# Patient Record
Sex: FEMALE | Race: WHITE | NOT HISPANIC OR LATINO | URBAN - METROPOLITAN AREA
[De-identification: names, ages, dates, MRNs, and addresses within clinical notes are randomized per-mention and may not be internally consistent; named-entity substitution may affect disease eponyms.]

---

## 2017-06-27 ENCOUNTER — APPOINTMENT (RX ONLY)
Dept: URBAN - METROPOLITAN AREA CLINIC 23 | Facility: CLINIC | Age: 57
Setting detail: DERMATOLOGY
End: 2017-06-27

## 2017-06-27 DIAGNOSIS — D49.2 NEOPLASM OF UNSPECIFIED BEHAVIOR OF BONE, SOFT TISSUE, AND SKIN: ICD-10-CM

## 2017-06-27 DIAGNOSIS — L82.1 OTHER SEBORRHEIC KERATOSIS: ICD-10-CM

## 2017-06-27 PROCEDURE — ? DEFER

## 2017-06-27 PROCEDURE — 99202 OFFICE O/P NEW SF 15 MIN: CPT

## 2017-06-27 PROCEDURE — ? COUNSELING

## 2017-06-27 ASSESSMENT — LOCATION SIMPLE DESCRIPTION DERM
LOCATION SIMPLE: RIGHT CHEEK
LOCATION SIMPLE: CHEST
LOCATION SIMPLE: RIGHT TEMPLE
LOCATION SIMPLE: LEFT CHEEK

## 2017-06-27 ASSESSMENT — LOCATION DETAILED DESCRIPTION DERM
LOCATION DETAILED: RIGHT MEDIAL MALAR CHEEK
LOCATION DETAILED: RIGHT CENTRAL TEMPLE
LOCATION DETAILED: LEFT INFERIOR MEDIAL MALAR CHEEK
LOCATION DETAILED: MIDDLE STERNUM

## 2017-06-27 ASSESSMENT — LOCATION ZONE DERM
LOCATION ZONE: FACE
LOCATION ZONE: TRUNK

## 2017-08-11 ENCOUNTER — APPOINTMENT (RX ONLY)
Dept: URBAN - METROPOLITAN AREA CLINIC 23 | Facility: CLINIC | Age: 57
Setting detail: DERMATOLOGY
End: 2017-08-11

## 2017-08-11 DIAGNOSIS — D49.2 NEOPLASM OF UNSPECIFIED BEHAVIOR OF BONE, SOFT TISSUE, AND SKIN: ICD-10-CM

## 2017-08-11 PROCEDURE — 11100: CPT

## 2017-08-11 PROCEDURE — ? COUNSELING

## 2017-08-11 PROCEDURE — ? BIOPSY BY PUNCH METHOD

## 2017-08-11 PROCEDURE — ? OTHER

## 2017-08-11 ASSESSMENT — LOCATION DETAILED DESCRIPTION DERM
LOCATION DETAILED: LEFT UPPER CUTANEOUS LIP
LOCATION DETAILED: LEFT INFERIOR MEDIAL MALAR CHEEK
LOCATION DETAILED: RIGHT MEDIAL MALAR CHEEK

## 2017-08-11 ASSESSMENT — LOCATION ZONE DERM
LOCATION ZONE: FACE
LOCATION ZONE: LIP

## 2017-08-11 ASSESSMENT — LOCATION SIMPLE DESCRIPTION DERM
LOCATION SIMPLE: RIGHT CHEEK
LOCATION SIMPLE: LEFT LIP
LOCATION SIMPLE: LEFT CHEEK

## 2017-08-11 NOTE — PROCEDURE: BIOPSY BY PUNCH METHOD
Post-Care Instructions: I reviewed with the patient in detail post-care instructions. Patient is to keep the biopsy site dry overnight, and then apply bacitracin twice daily until healed. Patient may apply hydrogen peroxide soaks to remove any crusting.
Biopsy Type: H and E
Epidermal Sutures: 5-0 Prolene
X Size Of Lesion In Cm (Optional): 0
Hemostasis: None
Anesthesia Type: 1% lidocaine without epinephrine and a 1:10 solution of 8.4% sodium bicarbonate
Notification Instructions: Patient will be notified of biopsy results. However, patient instructed to call the office if not contacted within 2 weeks.
Bill For Surgical Tray: no
Home Suture Removal Text: Patient was provided a home suture removal kit and will remove their sutures at home.  If they have any questions or difficulties they will call the office.
Punch Size In Mm: 3
Anesthesia Volume In Cc: 0.1
Suture Removal: 7 days
Detail Level: Detailed
Billing Type: Third-Party Bill
Dressing: bandage
Wound Care: Vaseline
Consent: Written consent was obtained and risks were reviewed including but not limited to scarring, infection, bleeding, scabbing, incomplete removal, nerve damage and allergy to anesthesia.

## 2017-08-18 ENCOUNTER — APPOINTMENT (RX ONLY)
Dept: URBAN - METROPOLITAN AREA CLINIC 24 | Facility: CLINIC | Age: 57
Setting detail: DERMATOLOGY
End: 2017-08-18

## 2017-08-18 DIAGNOSIS — Z48.02 ENCOUNTER FOR REMOVAL OF SUTURES: ICD-10-CM

## 2017-08-18 PROBLEM — F32.9 MAJOR DEPRESSIVE DISORDER, SINGLE EPISODE, UNSPECIFIED: Status: ACTIVE | Noted: 2017-08-18

## 2017-08-18 PROBLEM — D23.39 OTHER BENIGN NEOPLASM OF SKIN OF OTHER PARTS OF FACE: Status: ACTIVE | Noted: 2017-08-18

## 2017-08-18 PROCEDURE — ? COUNSELING

## 2017-08-18 PROCEDURE — ? OBSERVATION

## 2017-08-18 PROCEDURE — ? SUTURE REMOVAL (GLOBAL PERIOD)

## 2017-08-18 PROCEDURE — ? OTHER

## 2017-08-18 ASSESSMENT — LOCATION SIMPLE DESCRIPTION DERM
LOCATION SIMPLE: LEFT LIP
LOCATION SIMPLE: LEFT LIP

## 2017-08-18 ASSESSMENT — LOCATION DETAILED DESCRIPTION DERM
LOCATION DETAILED: LEFT UPPER CUTANEOUS LIP
LOCATION DETAILED: LEFT UPPER CUTANEOUS LIP

## 2017-08-18 ASSESSMENT — LOCATION ZONE DERM
LOCATION ZONE: LIP
LOCATION ZONE: LIP

## 2017-08-18 NOTE — PROCEDURE: OTHER
Detail Level: Zone
Note Text (......Xxx Chief Complaint.): This diagnosis correlates with the
Other (Free Text): Discussed that is is likely diagnosis given path on other lesion since they look similar. Discussed the only way to know for sure would be to biopsy. She wants to monitor for now. Lesion on right cheek has resolved.

## 2017-08-18 NOTE — PROCEDURE: SUTURE REMOVAL (GLOBAL PERIOD)
Detail Level: Detailed
Add 58537 Cpt? (Important Note: In 2017 The Use Of 38133 Is Being Tracked By Cms To Determine Future Global Period Reimbursement For Global Periods): no

## 2019-05-29 ENCOUNTER — APPOINTMENT (RX ONLY)
Dept: URBAN - METROPOLITAN AREA CLINIC 24 | Facility: CLINIC | Age: 59
Setting detail: DERMATOLOGY
End: 2019-05-29

## 2019-05-29 DIAGNOSIS — L82.1 OTHER SEBORRHEIC KERATOSIS: ICD-10-CM

## 2019-05-29 DIAGNOSIS — Z71.89 OTHER SPECIFIED COUNSELING: ICD-10-CM

## 2019-05-29 PROCEDURE — 99212 OFFICE O/P EST SF 10 MIN: CPT

## 2019-05-29 PROCEDURE — ? OTHER

## 2019-05-29 PROCEDURE — ? COUNSELING

## 2019-05-29 NOTE — PROCEDURE: OTHER
Other (Free Text): Recommended she schedule skin exam this summer
Detail Level: Zone
Note Text (......Xxx Chief Complaint.): This diagnosis correlates with the
Other (Free Text): Plan : Liquid Nitrogen (Cosmetic). A total of 2 lesions were treated with liquid nitrogen, located on the left medial superior chest and right lateral temple. The patient's consent was obtained including but not limited to risks of crusting, scabbing, blistering, scarring, darker or lighter pigmentary change, recurrence, incomplete removal and infection. The patient understands that the procedure is cosmetic in nature and is not covered by insurance.\\nI reviewed with the patient in detail post-care instructions. Patient is to wear sunprotection, and avoid picking at any of the treated lesions. Pt may apply Vaseline to crusted or scabbing areas.

## 2019-10-09 ENCOUNTER — APPOINTMENT (RX ONLY)
Dept: URBAN - METROPOLITAN AREA CLINIC 24 | Facility: CLINIC | Age: 59
Setting detail: DERMATOLOGY
End: 2019-10-09

## 2019-10-09 DIAGNOSIS — L82.1 OTHER SEBORRHEIC KERATOSIS: ICD-10-CM

## 2019-10-09 DIAGNOSIS — Z71.89 OTHER SPECIFIED COUNSELING: ICD-10-CM

## 2019-10-09 DIAGNOSIS — L30.4 ERYTHEMA INTERTRIGO: ICD-10-CM

## 2019-10-09 PROBLEM — F41.9 ANXIETY DISORDER, UNSPECIFIED: Status: ACTIVE | Noted: 2019-10-09

## 2019-10-09 PROBLEM — J30.1 ALLERGIC RHINITIS DUE TO POLLEN: Status: ACTIVE | Noted: 2019-10-09

## 2019-10-09 PROBLEM — D23.39 OTHER BENIGN NEOPLASM OF SKIN OF OTHER PARTS OF FACE: Status: ACTIVE | Noted: 2019-10-09

## 2019-10-09 PROCEDURE — ? COUNSELING

## 2019-10-09 PROCEDURE — ? PRESCRIPTION

## 2019-10-09 PROCEDURE — ? LIQUID NITROGEN (COSMETIC)

## 2019-10-09 PROCEDURE — ? OTHER

## 2019-10-09 PROCEDURE — 99213 OFFICE O/P EST LOW 20 MIN: CPT

## 2019-10-09 PROCEDURE — ? REFERRAL

## 2019-10-09 RX ORDER — KETOCONAZOLE 20 MG/G
CREAM TOPICAL
Qty: 60 | Refills: 3 | Status: ERX | COMMUNITY
Start: 2019-10-09

## 2019-10-09 RX ADMIN — KETOCONAZOLE: 20 CREAM TOPICAL at 18:23

## 2019-10-09 ASSESSMENT — LOCATION SIMPLE DESCRIPTION DERM
LOCATION SIMPLE: RIGHT CHEEK
LOCATION SIMPLE: CHEST

## 2019-10-09 ASSESSMENT — LOCATION DETAILED DESCRIPTION DERM
LOCATION DETAILED: MIDDLE STERNUM
LOCATION DETAILED: RIGHT MEDIAL MALAR CHEEK

## 2019-10-09 ASSESSMENT — LOCATION ZONE DERM
LOCATION ZONE: TRUNK
LOCATION ZONE: FACE

## 2019-10-09 NOTE — HPI: RASH
What Type Of Note Output Would You Prefer (Optional)?: Standard Output
How Severe Is Your Rash?: severe
Is This A New Presentation, Or A Follow-Up?: Rash
Additional History: Seems worse at end of day.

## 2019-10-09 NOTE — PROCEDURE: OTHER
Detail Level: Zone
Note Text (......Xxx Chief Complaint.): This diagnosis correlates with the
Other (Free Text): Recommended skin exam
Other (Free Text): Discussed i&d vs punch excision vs excision w Plastic Surgery, and she chose Plastics. She was given their phone number.

## 2021-06-02 ENCOUNTER — HOSPITAL ENCOUNTER (OUTPATIENT)
Dept: PHYSICAL THERAPY | Age: 61
Discharge: HOME | End: 2021-06-02
Payer: COMMERCIAL

## 2021-06-02 DIAGNOSIS — M48.02 CERVICAL SPINAL STENOSIS: ICD-10-CM

## 2021-06-02 DIAGNOSIS — M50.30 DDD (DEGENERATIVE DISC DISEASE), CERVICAL: ICD-10-CM

## 2021-06-02 DIAGNOSIS — M47.816 FACET ARTHROPATHY, LUMBAR: ICD-10-CM

## 2021-06-02 DIAGNOSIS — M51.36 DDD (DEGENERATIVE DISC DISEASE), LUMBAR: ICD-10-CM

## 2021-06-02 PROCEDURE — 97161 PT EVAL LOW COMPLEX 20 MIN: CPT

## 2021-06-02 PROCEDURE — 97140 MANUAL THERAPY 1/> REGIONS: CPT

## 2021-06-02 PROCEDURE — 97110 THERAPEUTIC EXERCISES: CPT

## 2021-06-02 NOTE — THERAPY EVALUATION
Sourav Rosa  : 1960  Primary: [facility]  Secondary:  Therapy Center at [facility] Emmett [address], [address] [facility]  [phone]   [phone]      OUTPATIENT PHYSICAL THERAPY:Initial Evaluation 2021    ICD-10: Treatment Diagnosis:   Cervicalgia (M54.2)  Radiculopathy, Cervical Region (M54.12)  Headache (R51)  Low back pain (M54.5)  Radiculopathy, Lumbar Region (M54.16)  Muscle Weakness, Generalized (M62.81)    Precautions/Allergies:   Patient has no known allergies. Fall Risk Score: 0 (? 5 = High Risk)  MD Orders: Eval and Treat MEDICAL/REFERRING DIAGNOSIS:  DDD (degenerative disc disease), cervical [M50.30]  Cervical spinal stenosis [M48.02]  DDD (degenerative disc disease), lumbar [M51.36]  Facet arthropathy, lumbar [M47.816]   DATE OF ONSET: Years  REFERRING PHYSICIAN: Vijaya Zazueta PA  RETURN PHYSICIAN APPOINTMENT:      INITIAL ASSESSMENT:  Ms. Sourav Rosa has attended 1 physical therapy session including initial evaluation. Sourav Rosa presents with S/S of increased pain, decreased ROM, decreased strength, decreased functional tolerance consistent with S/S of Cervicalgia and Low back pain. Sourav Rosa will benefit from home exercise program, therapeutic and postural strengthening exercises, manual therapeutic techniques (ie. Distraction, SOR, myofascial release/soft tissue mobilization) as appropriate to address Cifuentes Ouch current condition. Sourav Rosa will benefit from skilled PT (medically necessary) to address above deficits affecting participation in basic ADLs and overall functional tolerance. PROBLEM LIST (Impacting functional limitations):  1. Decreased Strength  2. Decreased ADL/Functional Activities  3. Decreased Transfer Abilities  4. Increased Pain  5. Decreased Activity Tolerance  6. Increased Fatigue  7. Increased Shortness of Breath  8. Decreased Flexibility/Joint Mobility  9.  Decreased [place] with Home Exercise Program INTERVENTIONS PLANNED:  1. Balance Exercise  2. Bed Mobility  3. Cold  4. Cryotherapy  5. Family Education  6. Gait Training  7. Heat  8. Home Exercise Program (HEP)  9. Manual Therapy  10. Neuromuscular Re-education/Strengthening  11. Range of Motion (ROM)  12. Therapeutic Activites  13. Therapeutic Exercise/Strengthening  14. Transfer Training   TREATMENT PLAN:  Effective Dates: 6/3/2021 TO 8/2/2021 (60 days). Frequency/Duration: 2 times a week for 60 Days  GOALS: (Goals have been discussed and agreed upon with patient.)     Short-Term Goals~4 weeks  Goal Met   1. Rufino Padilla will report <=2/10 pain to cervical spine with performance of functional spinal mobility and rotation. 1.  [] Date:   2. Rufino Padilla will demonstrate improved NDI score, indicating spinal improvement from 9/50 to 4/50 affecting minimal to no difficulty with performance of cervical mobility and strengthening. 2.  [] Date:   3.  3.  [] Date:   4.  4.  [] Date:   5. Rufino Padilla will improve MMT to >=4+/5 to all UE strengthening to return to PLOF and improve functional tolerance. 5.  [] Date:     6.  [] Date:         Long Term Goals~8 weeks Goal Met   1. Rufino Padilla will report <=2/10 pain to cervical spine with performance of functional spinal mobility and rotation and minimal to no difficulty with such tasks. 1.  [] Date:   2. Rufino Padilla will demonstrate improved NDI score, indicating spinal improvement from 4/50 to 2/50 affecting minimal to no difficulty with performance of cervical mobility and strengthening. 2.  [] Date:   3. Rufino Padilla to be independent with advanced HEP for cervical region and UE's. 3.  [] Date:   4.   4.  [] Date:          Outcome Measure: Tool Used: Neck Disability Index (NDI)  Score:  Initial: 9/50  Most Recent: X/50 (Date: -- )   Interpretation of Score:  The Neck Disability Index is a revised form of the Oswestry Low Back Pain Index and is designed to measure the activities of daily living in person's with neck pain. Each section is scored on a 0-5 scale, 5 representing the greatest disability. The scores of each section are added together for a total score of 50. Medical Necessity:   · Skilled intervention continues to be required due to address above deficits affecting participation in basic ADLs and overall functional tolerance. Reason for Services/Other Comments:  · Patient continues to require skilled intervention due to address above deficits affecting participation in basic ADLs and overall functional tolerance. Total Treatment Duration:   Time in/out   Time in:1440    Time out: 1545      Rehabilitation Potential For Stated Goals: GOOD  Regarding Santos Law's therapy, I certify that the treatment plan above will be carried out by a therapist or under their direction. Thank you for this referral,  Kylie Art PT     Referring Physician Signature: Vijaya Zazueta PA                        HISTORY:   History of Present Injury/Illness (Reason for Referral  Pt reports that she has an accident 3 years ago that she believes contributed to her neck pain today. Pt had x-rays of her neck and back from the chiropractor who referred her to her doctor. PT also had a MRI and found DDD and bulging discs in her neck and lumbar spine. Pt reports that she has tingling and numbness in her hands that comes on when she sleeps. Location:   Pain 1(P1): central neck pain around CT junction; tingling numbness in hands   Pain 2(P2): Pain in low back    Pain Severity:  Current:4/10  Best: 1/10  Worst: 7/10    · Aggravating factors: Turning head, Laying down, Driving, Carrying, Staying still and laying on side  · Relieving factors: Sitting, Walking and Rest  · Irritability: Medium (Onset of pain is equal to alleviation)      Past Medical History/Comorbidities:   Ms. Belem Mendes  has a past medical history of HTN (hypertension).   Ms. Belem Mendes  has a past surgical history that includes pr lap gastric bypass/lilibeth-en-y (2010). Active Ambulatory Problems     Diagnosis Date Noted    No Active Ambulatory Problems     Resolved Ambulatory Problems     Diagnosis Date Noted    No Resolved Ambulatory Problems     Past Medical History:   Diagnosis Date    HTN (hypertension)      Social History/Living Environment:        Social History     Socioeconomic History    Marital status: UNKNOWN     Spouse name: Not on file    Number of children: Not on file    Years of education: Not on file    Highest education level: Not on file   Occupational History    Not on file   Tobacco Use    Smoking status: Former Smoker    Smokeless tobacco: Never Used   Substance and Sexual Activity    Alcohol use: Not on file    Drug use: Not on file    Sexual activity: Not on file   Other Topics Concern    Not on file   Social History Narrative    Not on file     Social Determinants of Health     Financial Resource Strain:     Difficulty of Paying Living Expenses:    Food Insecurity:     Worried About Running Out of Food in the Last Year:     Ran Out of Food in the Last Year:    Transportation Needs:     Lack of Transportation (Medical):      Lack of Transportation (Non-Medical):    Physical Activity:     Days of Exercise per Week:     Minutes of Exercise per Session:    Stress:     Feeling of Stress :    Social Connections:     Frequency of Communication with Friends and Family:     Frequency of Social Gatherings with Friends and Family:     Attends Mosque Services:     Active Member of Clubs or Organizations:     Attends Club or Organization Meetings:     Marital Status:    Intimate Partner Violence:     Fear of Current or Ex-Partner:     Emotionally Abused:     Physically Abused:     Sexually Abused:       Prior Level of Function/Work/Activity:  Normal    Other Clinical Tests:          MRI Positive for DDD, Bulging discs and X-RAY Positive for Arthritis    Previous Treatment Approaches: none  Current Medications:    Current Outpatient Medications:     ergocalciferol (ERGOCALCIFEROL) 1,250 mcg (50,000 unit) capsule, , Disp: , Rfl:     escitalopram oxalate (LEXAPRO) 10 mg tablet, TAKE 1 TABLET BY MOUTH EVERY DAY, Disp: , Rfl:     Shayy 0.1 mg/24 hr, , Disp: , Rfl:     lisinopriL (PRINIVIL, ZESTRIL) 5 mg tablet, TAKE 1 TABLET BY MOUTH EVERY DAY, Disp: , Rfl:     multivitamin (ONE A DAY) tablet, Take 1 Tab by mouth daily. , Disp: , Rfl:     diclofenac (Voltaren) 1 % gel, Apply 4 g to affected area four (4) times daily. , Disp: 150 g, Rfl: 1        Ambulatory/Rehab Services H2 Model Falls Risk Assessment    Risk Factors:       No Risk Factors Identified Ability to Rise from Chair:       (0)  Ability to rise in a single movement    Falls Prevention Plan:       No modifications necessary   Total: (5 or greater = High Risk): 0    ©2010 [facility] [employer]. All Rights Reserved. [facility] Patent #[id number].  Federal Law prohibits the replication, distribution or use without written permission from [facility] [employer]         Date Last Reviewed:  6/3/2021     1-2: MODERATE COMPLEXITY   EXAMINATION:   Observation/Orthostatic Postural Assessment:          Kyphotic Posture  Palpation:          tenderness in cervical multifidus  ROM:      AROM/PROM                  Joint: Eval Date:  Re-Assess Date:  Re-Assess Date:    Active ROM           Cervical Extension 23          Cervical Flexion 90           RIGHT LEFT RIGHT LEFT RIGHT LEFT   Cervical Sidebending             Cervical Rotation 81 62                        Shoulder Flexion             Shoulder Abduction                          Lumbar Flexion             Lumber Extension                   Strength:     Eval Date:  Re-Assess Date:  Re-Assess Date:      RIGHT LEFT RIGHT LEFT RIGHT LEFT   Shoulder Flexion  4/5  4/5           Shoulder Abduction (C5)  4/5  4/5           Shoulder Internal Rotation  4+/5  4/5           Shoulder External Rotation  4+/5  4/5           Elbow Flexion (C6)  4+/5  4+/5           Elbow Extension (C7)  4+/5  4+/5           Wrist Flexion (C7)  5/5  5/5           Wrist Extension (C6)  5/5  5/5           Resisted Thumb Extension/Finger Abduction (C8/T1) Normal     Normal           Resisted Cervical Rotation (C1):             Resisted Shoulder Shrug (C2, 3, 4):               Strength     Osiris Keller                     Manual:  Joint Directon Grade Treatment Effect   Cervical PA, Distraction and Superior Glide II and III Improved Symptoms post treatment     Reflex Testing (Biceps, Brachioradialis, Triceps):Normal     Reflexes  Location LEFT Right   Biceps Normal Normal   Brachioradialis Normal Normal   Triceps Normal Normal         Special Tests:    Cervical Distraction:Positive  Sharp's Pursar:Positive              Upper Limb Tension Test Median Nerve:Right: + Left: +              Upper Limb Tension Test Ulnar: Right: + Left: +              Upper Limb Tension Test Radial: Right: + Left: +     Patient denies Dysarthria, , Diplopia, Drop attacks, , Dizziness or Dysphagia      Neurological Screen: Radiating symptoms: YES     Functional Mobility:  None    Balance:  Not Tested     Body Structures Involved:  1. Nerves  2. Joints  3. Muscles  4. Ligaments Body Functions Affected:  1. Sensory/Pain  2. Neuromusculoskeletal  3. Movement Related Activities and Participation Affected:  1. Mobility  2.  Self Care     Number of elements that affect the Plan of Care: 1-2: LOW COMPLEXITY   CLINICAL PRESENTATION:   Presentation: Evolving clinical presentation with changing clinical characteristics: MODERATE COMPLEXITY   CLINICAL DECISION MAKING:      Use of outcome tool(s) and clinical judgement create a POC that gives a: Questionable prediction of patient's progress: MODERATE COMPLEXITY   See treatment note for associated treatment provided today.      Future Appointments   Date Time Provider Edy Marie   6/7/2021  2:30 PM Lali Ellis, PT [facility] [place] [facility]   6/9/2021 11:15 AM Lali Ellis, PT [facility]   6/16/2021  8:15 AM Lali Ellis, PT [facility]   6/18/2021  8:15 AM Lali Ellis, PT [facility]   6/22/2021  1:45 PM Lali Ellis, PT [facility]   6/25/2021  9:00 AM Lali Ellis, PT [facility]   6/28/2021 10:30 AM Lali Ellis, PT [facility]   6/30/2021 10:30 AM Lali Ellis, PT [facility]   7/6/2021 10:30 AM Lali Ellis, PT [facility]   7/8/2021 10:30 AM Lali Ellis, PT [facility]         Matthew Whitfield, PT

## 2021-06-02 NOTE — PROGRESS NOTES
Sourav Rosa  : 1960  Payor: Po Vora / Plan: Carry [address] HMO / Product Type: HMO /  [address] [place] [address] at [address] [place] [address]. Neena CaldwellFarzaneh, [address] [place] [address], [address] [place] [address]  [phone]   [phone]                                                          Vijaya Zazueta, [place]      OUTPATIENT PHYSICAL THERAPY: Daily Treatment Note 2021 Visit Count:  1    Tx Diagnosis:  Cervicalgia (M54.2)  Radiculopathy, Cervical Region (M54.12)  Headache (R51)  Low back pain (M54.5)  Radiculopathy, Lumbar Region (M54.16)  Muscle Weakness, Generalized (M62.81)      Pre-treatment Symptoms/Complaints: See Initial Eval Dated 21 for more details. Pain: Initial:4/10  Medications Last Reviewed:  2021     Post Session: 0/10   Updated Objective Findings: See Initial Eval for more details. TREATMENT:   THERAPEUTIC EXERCISE: (13 minutes):  Exercises per grid below to improve mobility, strength and balance. Required minimal visual, verbal and manual cues to promote proper body alignment and promote proper body posture. Progressed resistance and complexity of movement as indicated. Date:  2021 Date:   Date:     Activity/Exercise Parameters Parameters Parameters   Education HEP, POC, PT goals, anatomy/pathology, education, MRI findings     Thoracic extension 20xs over chair     SNAGS 10 seconds 10xs                                   THERAPEUTIC ACTIVITY: ( 0 minutes): Activities per gid below to improve functional movement related mobility, strength and balance to improve neuro-muscular carryover to daily functional activities for improving patient's quality of life. Required visual, verbal and manual cues to promote proper body alignment and promote proper body posture/mechanics. Progressed resistance and complexity of movement as indicated.      Date:  2021 Date:   Date:     Activity/Exercise Parameters Parameters Parameters MANUAL THERAPY: (15 minutes): Joint mobilization, Soft tissue mobilization was utilized and necessary because of the patient's restricted joint motion and restricted motion of soft tissue mobility. Date  6/2/2021    Technique Used Grade Level # Time(s) Effect while being performed   Cervical traction   4 min    SALS II,III  7 min    Soft tissue to multifidus   4 min                                             HEP Log Date    6/2/2021   2.  6/2/2021   3. 6/2/2021   4.    5.           [employer] [facility] Portal  Treatment/Session Summary:    Response to Treatment: Pt demonstrated understanding of POC and initial HEP. No increase in pain or adverse reactions. Communication/Consultation:  POC, HEP, PT goals, Faxed initial evaluation to MD.   Equipment provided today: HEP Handout   Recommendations/Intent for next treatment session:   Next visit will focus on Flexion-based Exercises Extension-Based Exercises (EOTA) Manual Therapy Core Stability Dry Needling Hip strengthening RTC strengthening. Treatment Plan of Care Effective Dates: 6/2/2021 TO 8/2/2021 (60 days).   Frequency/Duration: 2 times a week for 60 Days             Total Treatment Billable Duration:   28  Rx plus Eval     Time in/out   Time in:1440    Time out: [place], PT    Future Appointments   Date Time Provider Edy Marie   6/2/2021  2:30 PM Maynor Romero PT [facility]

## 2021-06-07 ENCOUNTER — HOSPITAL ENCOUNTER (OUTPATIENT)
Dept: PHYSICAL THERAPY | Age: 61
Discharge: HOME | End: 2021-06-07
Payer: COMMERCIAL

## 2021-06-07 PROCEDURE — 97140 MANUAL THERAPY 1/> REGIONS: CPT

## 2021-06-07 PROCEDURE — 97110 THERAPEUTIC EXERCISES: CPT

## 2021-06-07 NOTE — PROGRESS NOTES
Ebonie Mosher  : 1960  Payor: Cesar Matilde / Plan: Carry [address] HMO / Product Type: HMO /  [address] at [address] [facility]. Chaudhary CtFarzaneh, [address] [place] [address], [facility], [address] [place] [address]  [phone]   [phone]                                                          Vijaya Zazueta PA      OUTPATIENT PHYSICAL THERAPY: Daily Treatment Note 2021 Visit Count:  2    Tx Diagnosis:  Cervicalgia (M54.2)  Radiculopathy, Cervical Region (M54.12)  Headache (R51)  Low back pain (M54.5)  Radiculopathy, Lumbar Region (M54.16)  Muscle Weakness, Generalized (M62.81)      Pre-treatment Symptoms/Complaints: Pt reports that she is doing better and her neck is moving much better   Pain: Initial:4/10  Medications Last Reviewed:  2021     Post Session: 0/10   Updated Objective Findings: See Initial Eval for more details. TREATMENT:   THERAPEUTIC EXERCISE: (25 minutes):  Exercises per grid below to improve mobility, strength and balance. Required minimal visual, verbal and manual cues to promote proper body alignment and promote proper body posture. Progressed resistance and complexity of movement as indicated. Date:  2021 Date:  2021 Date:     Activity/Exercise Parameters Parameters Parameters   Education HEP, POC, PT goals, anatomy/pathology, education, MRI findings     Thoracic extension 20xs over chair 15xs over foam roll    SNAGS 10 seconds 10xs     rows  27lbs 3x10    Lat Pulldowns  27lbs 3x10    Pec stretch  1 min    No money  20xs blue          THERAPEUTIC ACTIVITY: ( 0 minutes): Activities per gid below to improve functional movement related mobility, strength and balance to improve neuro-muscular carryover to daily functional activities for improving patient's quality of life. Required visual, verbal and manual cues to promote proper body alignment and promote proper body posture/mechanics.  Progressed resistance and complexity of movement as indicated. Date:  6/7/2021 Date:   Date:     Activity/Exercise Parameters Parameters Parameters                                                   MANUAL THERAPY: (15 minutes): Joint mobilization, Soft tissue mobilization was utilized and necessary because of the patient's restricted joint motion and restricted motion of soft tissue mobility. Date  6/7/2021    Technique Used Grade Level # Time(s) Effect while being performed   Cervical traction   4 min    SALS II,III  3 min    Soft tissue to multifidus   8min                                             HEP Log Date    6/7/2021   2.  6/7/2021   3. 6/7/2021   4.    5.           [employer] Portal  Treatment/Session Summary:    Response to Treatment: Pt reponded well to treatment and had improvement in mid thoracic pain. Educated on posture related exercises. Communication/Consultation:  POC, HEP, PT goals, Faxed initial evaluation to MD.   Equipment provided today: HEP Handout   Recommendations/Intent for next treatment session:   Next visit will focus on Flexion-based Exercises Extension-Based Exercises (EOTA) Manual Therapy Core Stability Dry Needling Hip strengthening RTC strengthening. Treatment Plan of Care Effective Dates: 6/2/2021 TO 8/2/2021 (60 days).   Frequency/Duration: 2 times a week for 60 Days             Total Treatment Billable Duration:   40  Rx   PT Patient Time In/Time Out  Time In: 4292  Time Out: [place], PT    Future Appointments   Date Time Provider Edy Marie   6/9/2021 11:15 AM Daril Maceo, PT [facility]   6/16/2021  8:15 AM Daril Maceo, PT [facility]   6/18/2021  8:15 AM Daril Maceo, PT [facility]   6/22/2021  1:45 PM Daril Maceo, PT [facility]   6/25/2021  9:00 AM Daril Maceo, PT [facility]   6/28/2021 10:30 AM Daril Maceo, PT [facility]   6/30/2021 10:30 AM Daril Maceo, PT [facility]   7/6/2021 10:30 AM Bola Mendoza Tiffany Leyva PT [facility]   7/8/2021 10:30 AM JUANIS RiosOSRPORIANA [facility]

## 2021-06-09 ENCOUNTER — HOSPITAL ENCOUNTER (OUTPATIENT)
Dept: PHYSICAL THERAPY | Age: 61
Discharge: HOME | End: 2021-06-09
Payer: COMMERCIAL

## 2021-06-09 PROCEDURE — 97110 THERAPEUTIC EXERCISES: CPT

## 2021-06-09 PROCEDURE — 97140 MANUAL THERAPY 1/> REGIONS: CPT

## 2021-06-09 NOTE — PROGRESS NOTES
Santosh Rai  : 1960  Payor: Desmond Ask / Plan: Carry Ma[address] HMO / Product Type: HMO /  [address] [place] [address] at [address] [place] [address]. Neena Maciel, [address] [place] [address], [address] [place] [address]  [phone]   [phone]                                                          Vijaya Zazueta PA      OUTPATIENT PHYSICAL THERAPY: Daily Treatment Note 2021 Visit Count:  3    Tx Diagnosis:  Cervicalgia (M54.2)  Radiculopathy, Cervical Region (M54.12)  Headache (R51)  Low back pain (M54.5)  Radiculopathy, Lumbar Region (M54.16)  Muscle Weakness, Generalized (M62.81)      Pre-treatment Symptoms/Complaints: Pt rpeort sthat she conitnues to improve at home and she is has mild neck pain. Pain: Initial:4/10  Medications Last Reviewed:  2021     Post Session: 0/10   Updated Objective Findings: See Initial Eval for more details. TREATMENT:   THERAPEUTIC EXERCISE: (35 minutes):  Exercises per grid below to improve mobility, strength and balance. Required minimal visual, verbal and manual cues to promote proper body alignment and promote proper body posture. Progressed resistance and complexity of movement as indicated. Date:  2021 Date:  2021 Date:  2021   Activity/Exercise Parameters Parameters Parameters   Education HEP, POC, PT goals, anatomy/pathology, education, MRI findings     Thoracic extension 20xs over chair 15xs over foam roll    SNAGS 10 seconds 10xs     rows  27lbs 3x10 30lbs 3x10   Lat Pulldowns  27lbs 3x10 30lbs 30xs   Pec stretch  1 min 2 min   No money  20xs blue Yellow band 30xs   Cervical motion   On deflated ball blue ball 20xs   L-stretch   15 seconds 4xs         THERAPEUTIC ACTIVITY: ( 0 minutes): Activities per gid below to improve functional movement related mobility, strength and balance to improve neuro-muscular carryover to daily functional activities for improving patient's quality of life.  Required visual, verbal and manual cues to promote proper body alignment and promote proper body posture/mechanics. Progressed resistance and complexity of movement as indicated. Date:  6/9/2021 Date:   Date:     Activity/Exercise Parameters Parameters Parameters                                                   MANUAL THERAPY: (10 minutes): Joint mobilization, Soft tissue mobilization was utilized and necessary because of the patient's restricted joint motion and restricted motion of soft tissue mobility. Date  6/9/2021    Technique Used Grade Level # Time(s) Effect while being performed   Cervical traction   2 min    SALS II,III      Soft tissue to multifidus   8min                                             HEP Log Date    6/9/2021   2.  6/9/2021   3. 6/9/2021   4.    5.           [employer] Portal  Treatment/Session Summary:    Response to Treatment: improved tolerance to treatment and continues to improve with motion. No pain with exercises and improvement on subjective symptoms. Communication/Consultation:  POC, HEP, PT goals, Faxed initial evaluation to MD.   Equipment provided today: HEP Handout   Recommendations/Intent for next treatment session:   Next visit will focus on Flexion-based Exercises Extension-Based Exercises (EOTA) Manual Therapy Core Stability Dry Needling Hip strengthening RTC strengthening. Treatment Plan of Care Effective Dates: 6/2/2021 TO 8/2/2021 (60 days).   Frequency/Duration: 2 times a week for 60 Days             Total Treatment Billable Duration:   45  Rx   PT Patient Time In/Time Out  Time In: 1115  Time Out: [address] [facility] [place], PT    Future Appointments   Date Time Provider Edy Marie   6/16/2021  8:15 AM Rene Michele PT [facility]   6/18/2021  8:15 AM Rene Michele PT [facility]   6/22/2021  1:45 PM Rene Michele PT [facility]   6/25/2021  9:00 AM Rene Michele PT [facility]   6/28/2021 10:30 AM Rene Michele PT [facility] 6/29/2021 10:05 AM Madina Li MD [facility] POA   6/30/2021 10:30 AM Meri An, PT [facility]   7/6/2021 10:30 AM Meri An, PT [facility]   7/8/2021 10:30 AM Meri An, PT [facility]

## 2021-06-16 ENCOUNTER — HOSPITAL ENCOUNTER (OUTPATIENT)
Dept: PHYSICAL THERAPY | Age: 61
Discharge: HOME | End: 2021-06-16
Payer: COMMERCIAL

## 2021-06-16 PROCEDURE — 97110 THERAPEUTIC EXERCISES: CPT

## 2021-06-16 NOTE — PROGRESS NOTES
Conchis Llamas  : 1960  Payor: Angel Berrios / Plan: Carry Maikol [address] HMO / Product Type: HMO /  [address] at [address] [facility], [address] [place] [address], [facility], [address] [place] [address]  [phone]   [phone]                                                          Vijaya Zazueta, [place]      OUTPATIENT PHYSICAL THERAPY: Daily Treatment Note 2021 Visit Count:  4    Tx Diagnosis:  Cervicalgia (M54.2)  Radiculopathy, Cervical Region (M54.12)  Headache (R51)  Low back pain (M54.5)  Radiculopathy, Lumbar Region (M54.16)  Muscle Weakness, Generalized (M62.81)      Pre-treatment Symptoms/Complaints: Pt rpeort sthat she conitnues to improve at home and she is has mild neck pain. Pain: Initial:4/10  Medications Last Reviewed:  2021     Post Session: 0/10   Updated Objective Findings: See Initial Eval for more details. TREATMENT:   THERAPEUTIC EXERCISE: (30 minutes):  Exercises per grid below to improve mobility, strength and balance. Required minimal visual, verbal and manual cues to promote proper body alignment and promote proper body posture. Progressed resistance and complexity of movement as indicated. Date:  2021 Date:  2021 Date:  2021 Date  2021   Activity/Exercise Parameters Parameters Parameters    Education HEP, POC, PT goals, anatomy/pathology, education, MRI findings      Thoracic extension 20xs over chair 15xs over foam roll     SNAGS 10 seconds 10xs      rows  27lbs 3x10 30lbs 3x10 33 lbs 3x10   Lat Pulldowns  27lbs 3x10 30lbs 30xs 33lbs 3x10   Pec stretch  1 min 2 min    No money  20xs blue Yellow band 30xs Red band   Cervical motion   On deflated ball blue ball 20xs    L-stretch   15 seconds 4xs 15 seconds 2xs   Snow angels on the wall    20xs         THERAPEUTIC ACTIVITY: ( 0 minutes):  Activities per gid below to improve functional movement related mobility, strength and balance to improve neuro-muscular carryover to daily functional activities for improving patient's quality of life. Required visual, verbal and manual cues to promote proper body alignment and promote proper body posture/mechanics. Progressed resistance and complexity of movement as indicated. Date:  6/16/2021 Date:   Date:     Activity/Exercise Parameters Parameters Parameters                                                   MANUAL THERAPY: (11 minutes): Joint mobilization, Soft tissue mobilization was utilized and necessary because of the patient's restricted joint motion and restricted motion of soft tissue mobility. Date  6/16/2021    Technique Used Grade Level # Time(s) Effect while being performed   Cervical traction   8 min    SALS II,III      Soft tissue to multifidus   3 min                                             HEP Log Date    6/16/2021   2.  6/16/2021   3. 6/16/2021   4.    5.           [employer] Portal  Treatment/Session Summary:    Response to Treatment: Pt continued with strengthening and mobility exercises to imporve thoracic mobility. Good response to exercises and had improved tolerance to exercises. Communication/Consultation:  POC, HEP, PT goals, Faxed initial evaluation to MD.   Equipment provided today: HEP Handout   Recommendations/Intent for next treatment session:   Next visit will focus on Flexion-based Exercises Extension-Based Exercises (EOTA) Manual Therapy Core Stability Dry Needling Hip strengthening RTC strengthening. Treatment Plan of Care Effective Dates: 6/2/2021 TO 8/2/2021 (60 days).   Frequency/Duration: 2 times a week for 60 Days             Total Treatment Billable Duration:   41  Rx   PT Patient Time In/Time Out  Time In: 0815  Time Out: 0900  Tabby Ocampo PT    Future Appointments   Date Time Provider Edy Marie   6/17/2021 11:15 AM JUANIS Steven   6/22/2021  1:45 PM Reny Hanley PT [facility]   6/25/2021  9:00 AM Reny Hanley PT [facility] [place] [facility]   6/28/2021 10:30 AM Jaden Soni, PT [facility]   6/29/2021 10:05 AM Aminah Longoria MD [facility]   6/30/2021 10:30 AM Jaden Soni, PT [facility]   7/6/2021 10:30 AM Jaden Soni, PT [facility]   7/8/2021 10:30 AM Jaden Soni, PT [facility]

## 2021-06-17 ENCOUNTER — HOSPITAL ENCOUNTER (OUTPATIENT)
Dept: PHYSICAL THERAPY | Age: 61
Discharge: HOME | End: 2021-06-17
Payer: COMMERCIAL

## 2021-06-17 PROCEDURE — 97110 THERAPEUTIC EXERCISES: CPT

## 2021-06-17 PROCEDURE — 97140 MANUAL THERAPY 1/> REGIONS: CPT

## 2021-06-17 NOTE — PROGRESS NOTES
Santos Reinoso  : 1960  Payor: Enid Payne / Plan: Carry [address] HMO / Product Type: HMO /  [address] [place] [address] at [address] [place] [address]. Neena Maciel, [address] [place] [address], [address] [place] [address]  [phone]   [phone]                                                          Vijaya Zazueta, [address]      OUTPATIENT PHYSICAL THERAPY: Daily Treatment Note 2021 Visit Count:  5    Tx Diagnosis:  Cervicalgia (M54.2)  Radiculopathy, Cervical Region (M54.12)  Headache (R51)  Low back pain (M54.5)  Radiculopathy, Lumbar Region (M54.16)  Muscle Weakness, Generalized (M62.81)      Pre-treatment Symptoms/Complaints: Pt reports that she is doing well but had mild hand tingling. Pain: Initial:4/10  Medications Last Reviewed:  2021     Post Session: 0/10   Updated Objective Findings: See Initial Eval for more details. TREATMENT:   THERAPEUTIC EXERCISE: (30 minutes):  Exercises per grid below to improve mobility, strength and balance. Required minimal visual, verbal and manual cues to promote proper body alignment and promote proper body posture. Progressed resistance and complexity of movement as indicated. Date:  2021 Date:  2021 Date:  2021 Date  2021 Date  2021   Activity/Exercise Parameters Parameters Parameters     Education HEP, POC, PT goals, anatomy/pathology, education, MRI findings       Thoracic extension 20xs over chair 15xs over foam roll      SNAGS 10 seconds 10xs       rows  27lbs 3x10 30lbs 3x10 33 lbs 3x10 27lbs 3x15   Lat Pulldowns  27lbs 3x10 30lbs 30xs 33lbs 3x10 33 lbs 3x10   Pec stretch  1 min 2 min     No money  20xs blue Yellow band 30xs Red band Red band 20xs   Cervical motion   On deflated ball blue ball 20xs     L-stretch   15 seconds 4xs 15 seconds 2xs    Snow angels on the wall    20xs On foam roll 6 min   Lat squeeze     30xs red band         THERAPEUTIC ACTIVITY: ( 0 minutes):  Activities per gid below to improve functional movement related mobility, strength and balance to improve neuro-muscular carryover to daily functional activities for improving patient's quality of life. Required visual, verbal and manual cues to promote proper body alignment and promote proper body posture/mechanics. Progressed resistance and complexity of movement as indicated. Date:  6/17/2021 Date:   Date:     Activity/Exercise Parameters Parameters Parameters                                                   MANUAL THERAPY: (11 minutes): Joint mobilization, Soft tissue mobilization was utilized and necessary because of the patient's restricted joint motion and restricted motion of soft tissue mobility. Date  6/17/2021    Technique Used Grade Level # Time(s) Effect while being performed   Cervical traction   8 min    SALS II,III      Soft tissue to multifidus   3 min                                             HEP Log Date    6/17/2021   2.  6/17/2021   3. 6/17/2021   4.    5.           [employer] Portal  Treatment/Session Summary:    Response to Treatment: Pt had good response to treatment. Communication/Consultation:  POC, HEP, PT goals, Faxed initial evaluation to MD.   Equipment provided today: HEP Handout   Recommendations/Intent for next treatment session:   Next visit will focus on Flexion-based Exercises Extension-Based Exercises (EOTA) Manual Therapy Core Stability Dry Needling Hip strengthening RTC strengthening. Treatment Plan of Care Effective Dates: 6/2/2021 TO 8/2/2021 (60 days).   Frequency/Duration: 2 times a week for 60 Days             Total Treatment Billable Duration:   41  Rx   PT Patient Time In/Time Out  Time In: 1115  Time Out: [address] [facility], PT    Future Appointments   Date Time Provider Edy Marie   6/22/2021  1:45 PM Nichelle Myers PT [facility]   6/25/2021  9:00 AM JUANIS Winston [facility]   6/28/2021 10:30 AM JUANIS Winston [facility]   6/29/2021 10:05 AM Lamar Slater MD [facility] POAI POA   6/30/2021 10:30 AM Maynor Romero, PT [facility]   7/6/2021 10:30 AM Maynor Romero, PT [facility]   7/8/2021 10:30 AM Iliana Hayes, PT [facility]

## 2021-06-18 ENCOUNTER — APPOINTMENT (OUTPATIENT)
Dept: PHYSICAL THERAPY | Age: 61
End: 2021-06-18
Payer: COMMERCIAL

## 2021-06-25 ENCOUNTER — APPOINTMENT (OUTPATIENT)
Dept: PHYSICAL THERAPY | Age: 61
End: 2021-06-25
Payer: COMMERCIAL

## 2021-06-28 ENCOUNTER — HOSPITAL ENCOUNTER (OUTPATIENT)
Dept: PHYSICAL THERAPY | Age: 61
Discharge: HOME | End: 2021-06-28
Payer: COMMERCIAL

## 2021-06-28 PROCEDURE — 97110 THERAPEUTIC EXERCISES: CPT

## 2021-06-28 NOTE — PROGRESS NOTES
Kaylee Arenas  : 1960  Payor: Trice Landerosallum / Plan: Carry [address] HMO / Product Type: HMO /  [address] at [address] [facility]. [facility], [address] [place] [address], [facility], [address] [place] [address]  [phone]   [phone]                                                          Vijaya Zazueta PA      OUTPATIENT PHYSICAL THERAPY: Daily Treatment Note 2021 Visit Count:  6    Tx Diagnosis:  Cervicalgia (M54.2)  Radiculopathy, Cervical Region (M54.12)  Headache (R51)  Low back pain (M54.5)  Radiculopathy, Lumbar Region (M54.16)  Muscle Weakness, Generalized (M62.81)      Pre-treatment Symptoms/Complaints: Pt reports that she had an ijection and all she feels is mild tightness   Pain: Initial:4/10  Medications Last Reviewed:  2021     Post Session: 0/10   Updated Objective Findings: See Initial Eval for more details. TREATMENT:   THERAPEUTIC EXERCISE: (30 minutes):  Exercises per grid below to improve mobility, strength and balance. Required minimal visual, verbal and manual cues to promote proper body alignment and promote proper body posture. Progressed resistance and complexity of movement as indicated. Date:  2021 Date:  2021 Date  2021 Date  2021 Date  2021   Activity/Exercise Parameters Parameters      Education        Thoracic extension 15xs over foam roll    15xs over foam roll   SNAGS        rows 27lbs 3x10 30lbs 3x10 33 lbs 3x10 27lbs 3x15 33 3x10   Lat Pulldowns 27lbs 3x10 30lbs 30xs 33lbs 3x10 33 lbs 3x10 37lbs 3x10   Pec stretch 1 min 2 min      No money 20xs blue Yellow band 30xs Red band Red band 20xs Red band   Cervical motion  On deflated ball blue ball 20xs      L-stretch  15 seconds 4xs 15 seconds 2xs  15 seconds 2xs   Snow angels on the wall   20xs On foam roll 6 min 6 min    Lat squeeze    30xs red band    DIagonals     grn band 30xs                         THERAPEUTIC ACTIVITY: ( 0 minutes):  Activities per gid below to improve functional movement related mobility, strength and balance to improve neuro-muscular carryover to daily functional activities for improving patient's quality of life. Required visual, verbal and manual cues to promote proper body alignment and promote proper body posture/mechanics. Progressed resistance and complexity of movement as indicated. Date:  6/28/2021 Date:   Date:     Activity/Exercise Parameters Parameters Parameters                                                   MANUAL THERAPY: (0 minutes): Joint mobilization, Soft tissue mobilization was utilized and necessary because of the patient's restricted joint motion and restricted motion of soft tissue mobility. Date  6/28/2021    Technique Used Grade Level # Time(s) Effect while being performed                                                                 HEP Log Date    6/28/2021   2.  6/28/2021   3. 6/28/2021   4.    5.           [employer] Portal  Treatment/Session Summary:    Response to Treatment: Pt had good motion and postural restoration exercises that she responded well. Communication/Consultation:  POC, HEP, PT goals, Faxed initial evaluation to MD.   Equipment provided today: HEP Handout   Recommendations/Intent for next treatment session:   Next visit will focus on Flexion-based Exercises Extension-Based Exercises (EOTA) Manual Therapy Core Stability Dry Needling Hip strengthening RTC strengthening. Treatment Plan of Care Effective Dates: 6/2/2021 TO 8/2/2021 (60 days).   Frequency/Duration: 2 times a week for 60 Days             Total Treatment Billable Duration:   41  Rx   PT Patient Time In/Time Out  Time In: 1040  Time Out: [address] Francisco Javier Aguilar PT    Future Appointments   Date Time Provider Edy Marie   6/29/2021 10:05 AM MD SULEMA Gonzales   6/30/2021 10:30 AM Zoran Pillai, [facility]   7/8/2021 10:30 AM Rahul Patel, PT [facility]

## 2021-06-30 ENCOUNTER — HOSPITAL ENCOUNTER (OUTPATIENT)
Dept: PHYSICAL THERAPY | Age: 61
Discharge: HOME | End: 2021-06-30
Payer: COMMERCIAL

## 2021-06-30 PROCEDURE — 97140 MANUAL THERAPY 1/> REGIONS: CPT

## 2021-06-30 PROCEDURE — 97110 THERAPEUTIC EXERCISES: CPT

## 2021-06-30 NOTE — PROGRESS NOTES
Luke Bullard  : 1960  Payor: Beverly Jeans / Plan: [address] HMO / Product Type: HMO /  [address] [facility] at [address] [place] [address]. [address] [place] [address]  [phone]   [phone]                                                          Vijaya Zazueta PA      OUTPATIENT PHYSICAL THERAPY: Daily Treatment Note 2021 Visit Count:  7    Tx Diagnosis:  Cervicalgia (M54.2)  Radiculopathy, Cervical Region (M54.12)  Headache (R51)  Low back pain (M54.5)  Radiculopathy, Lumbar Region (M54.16)  Muscle Weakness, Generalized (M62.81)      Pre-treatment Symptoms/Complaints: Pt had mild catching in her neck yesterday   Pain: Initial:4/10  Medications Last Reviewed:  2021     Post Session: 0/10   Updated Objective Findings: See Initial Eval for more details. TREATMENT:   THERAPEUTIC EXERCISE: (30 minutes):  Exercises per grid below to improve mobility, strength and balance. Required minimal visual, verbal and manual cues to promote proper body alignment and promote proper body posture. Progressed resistance and complexity of movement as indicated. Date:  2021 Date  2021 Date  2021 Date  2021 Date  2021   Activity/Exercise Parameters       Education        Thoracic extension    15xs over foam roll    SNAGS        rows 30lbs 3x10 33 lbs 3x10 27lbs 3x15 33 3x10 37 lbs 3x10   Lat Pulldowns 30lbs 30xs 33lbs 3x10 33 lbs 3x10 37lbs 3x10 40lbs 3x10   Pec stretch 2 min       No money Yellow band 30xs Red band Red band 20xs Red band Red band 20xs   Cervical motion On deflated ball blue ball 20xs       L-stretch 15 seconds 4xs 15 seconds 2xs  15 seconds 2xs 15 seconds 3xs   Snow angels on the wall  20xs On foam roll 6 min 6 min     Lat squeeze   30xs red band     DIagonals    grn band 30xs grn band 30xs                         THERAPEUTIC ACTIVITY: ( 0 minutes):  Activities per gid below to improve functional movement related mobility, strength and balance to improve neuro-muscular carryover to daily functional activities for improving patient's quality of life. Required visual, verbal and manual cues to promote proper body alignment and promote proper body posture/mechanics. Progressed resistance and complexity of movement as indicated. Date:  6/30/2021 Date:   Date:     Activity/Exercise Parameters Parameters Parameters                                                   MANUAL THERAPY: (0 minutes): Joint mobilization, Soft tissue mobilization was utilized and necessary because of the patient's restricted joint motion and restricted motion of soft tissue mobility. Date  6/30/2021    Technique Used Grade Level # Time(s) Effect while being performed   IMP SALS   13 min                                                            HEP Log Date    6/30/2021   2.  6/30/2021   3. 6/30/2021   4.    5.           [employer] Portal  Treatment/Session Summary:    Response to Treatment: Pt responded well to manual therapy and focused on motion post treatment. Educated patient on how to improve ROM when her neck starts acting up. Communication/Consultation:  POC, HEP, PT goals, Faxed initial evaluation to MD.   Equipment provided today: HEP Handout   Recommendations/Intent for next treatment session:   Next visit will focus on Flexion-based Exercises Extension-Based Exercises (EOTA) Manual Therapy Core Stability Dry Needling Hip strengthening RTC strengthening. Treatment Plan of Care Effective Dates: 6/2/2021 TO 8/2/2021 (60 days).   Frequency/Duration: 2 times a week for 60 Days             Total Treatment Billable Duration:   43  Rx   PT Patient Time In/Time Out  Time In: 1030  Time Out: [address] Smalls PT    Future Appointments   Date Time Provider Edy Marie   7/2/2021 10:00 AM Kailee Cotter MD [facility]   7/8/2021 10:30 AM Clari Grimes PT [facility]   7/9/2021 10:05 AM Tamy Ch MD POAP POA   7/19/2021 10:30 AM Franco Celestin PT [facility]

## 2021-07-02 PROBLEM — Z98.84 H/O GASTRIC BYPASS: Status: ACTIVE | Noted: 2019-06-07

## 2021-07-02 PROBLEM — M17.11 PRIMARY OSTEOARTHRITIS OF RIGHT KNEE: Status: ACTIVE | Noted: 2019-01-03

## 2021-07-02 PROBLEM — Z12.11 SCREEN FOR COLON CANCER: Status: ACTIVE | Noted: 2019-08-29

## 2021-07-02 PROBLEM — I10 ESSENTIAL HYPERTENSION: Status: ACTIVE | Noted: 2019-06-07

## 2021-07-02 PROBLEM — F32.5 MAJOR DEPRESSIVE DISORDER IN FULL REMISSION (HCC): Status: ACTIVE | Noted: 2019-06-07

## 2021-07-06 ENCOUNTER — APPOINTMENT (OUTPATIENT)
Dept: PHYSICAL THERAPY | Age: 61
End: 2021-07-06
Payer: COMMERCIAL

## 2021-07-08 ENCOUNTER — HOSPITAL ENCOUNTER (OUTPATIENT)
Dept: PHYSICAL THERAPY | Age: 61
Discharge: HOME | End: 2021-07-08
Payer: COMMERCIAL

## 2021-07-08 PROCEDURE — 97110 THERAPEUTIC EXERCISES: CPT

## 2021-07-08 NOTE — PROGRESS NOTES
Debra Screen  : 1960  Payor: Dean Benites / Plan: Carry [address] HMO / Product Type: HMO /  Fede Hurt at 4 [facility]. [facility], [address] [place] [address], [facility], [address] [place] [address]  [phone]   [phone]                                                          Vijaya Zazueta, [place]      OUTPATIENT PHYSICAL THERAPY: Daily Treatment Note 2021   Visit Count:  8    Tx Diagnosis:  Cervicalgia (M54.2)  Radiculopathy, Cervical Region (M54.12)  Headache (R51)  Low back pain (M54.5)  Radiculopathy, Lumbar Region (M54.16)  Muscle Weakness, Generalized (M62.81)      Pre-treatment Symptoms/Complaints: *  Pain free over last 3 days of neck. Left hip and low back been bothering over last few nights in bed. \"Mitesh (primary therapist) said we would address this when I return from being out of town next week\"   Pain: Initial:0/10  Medications Last Reviewed:  2021     Post Session: 0/10   Updated Objective Findings: See Initial Eval for more details. TREATMENT:   THERAPEUTIC EXERCISE: (30 minutes):  Exercises per grid below to improve mobility, strength and balance. Required minimal visual, verbal and manual cues to promote proper body alignment and promote proper body posture. Progressed resistance and complexity of movement as indicated.      Date:  2021 Date  2021 Date  2021 Date  2021 Date  2021   Activity/Exercise Parameters        Education         Thoracic extension    15xs over foam roll  X 5 min total over foam roller    SNAGS         rows 30lbs 3x10 33 lbs 3x10 27lbs 3x15 33 3x10 37 lbs 3x10 37# 2 x 10   40# x 10    Lat Pulldowns 30lbs 30xs 33lbs 3x10 33 lbs 3x10 37lbs 3x10 40lbs 3x10 40# 3 x 10    Pec stretch 2 min     Doorway    No money Yellow band 30xs Red band Red band 20xs Red band Red band 20xs    Cervical motion On deflated ball blue ball 20xs        L-stretch 15 seconds 4xs 15 seconds 2xs  15 seconds 2xs 15 seconds 3xs [place] on the wall  20xs On foam roll 6 min 6 min   X 3 min    Lat squeeze   30xs red band      DIagonals    grn band 30xs grn band 30xs                            THERAPEUTIC ACTIVITY: ( 0 minutes): Activities per gid below to improve functional movement related mobility, strength and balance to improve neuro-muscular carryover to daily functional activities for improving patient's quality of life. Required visual, verbal and manual cues to promote proper body alignment and promote proper body posture/mechanics. Progressed resistance and complexity of movement as indicated. Date:  7/8/2021 Date:   Date:     Activity/Exercise Parameters Parameters Parameters                                                   MANUAL THERAPY: (0 minutes): Joint mobilization, Soft tissue mobilization was utilized and necessary because of the patient's restricted joint motion and restricted motion of soft tissue mobility. Date  7/8/2021    Technique Used Grade Level # Time(s) Effect while being performed   [facility]                                                               HEP Log Date    7/8/2021   2.  7/8/2021   3. 7/8/2021   4.    5.           [employer] Portal  Treatment/Session Summary:    Response to Treatment: . Pt arrived without symptoms of cervical region. Only 30 min treatment today due to arriving 10 min late and unable to stay longer than scheduled end time. Focused today's session on activities pt unable to perform at home for upper body strengthening. Increased resistance without adverse reaction. No c/o's upon departure. Communication/Consultation:  POC, HEP, PT goals, Faxed initial evaluation to MD.   Equipment provided today: HEP Handout   Recommendations/Intent for next treatment session:   Next visit will focus on Flexion-based Exercises Extension-Based Exercises (EOTA) Manual Therapy Core Stability Dry Needling Hip strengthening RTC strengthening.          Treatment Plan of Care Effective Dates: 6/2/2021 TO 8/2/2021 (60 days).   Frequency/Duration: 2 times a week for 60 Days     Total Treatment Billable Duration:   30 min  Rx   PT Patient Time In/Time Out  Time In: 1045  Time Out: Dorie 49, PT    Future Appointments   Date Time Provider Edy Marie   7/19/2021 10:30 AM Avila Manrique, PT [facility] AND [facility]

## 2021-07-19 ENCOUNTER — HOSPITAL ENCOUNTER (OUTPATIENT)
Dept: PHYSICAL THERAPY | Age: 61
Discharge: HOME | End: 2021-07-19
Payer: COMMERCIAL

## 2021-07-19 PROCEDURE — 97110 THERAPEUTIC EXERCISES: CPT

## 2021-07-19 NOTE — PROGRESS NOTES
Dawson Canchola  : 1960  Payor: Erasmo Bean / Plan: Carry [address] HMO / Product Type: HMO /  [address] [place]. [facility], [address] [place] [address], [facility], [address] [place] [address]  [phone]   [phone]                                                          Vijaya Zazueta PA      OUTPATIENT PHYSICAL THERAPY: Daily Treatment Note 2021   Visit Count:  9    Tx Diagnosis:  Cervicalgia (M54.2)  Radiculopathy, Cervical Region (M54.12)  Headache (R51)  Low back pain (M54.5)  Radiculopathy, Lumbar Region (M54.16)  Muscle Weakness, Generalized (M62.81)      Pre-treatment Symptoms/Complaints: Pt reports that her neck pain is better and her low back isnt bothering her. Pain: Initial:0/10  Medications Last Reviewed:  2021     Post Session: 0/10   Updated Objective Findings: See Initial Eval for more details. TREATMENT:   THERAPEUTIC EXERCISE: (43 minutes):  Exercises per grid below to improve mobility, strength and balance. Required minimal visual, verbal and manual cues to promote proper body alignment and promote proper body posture. Progressed resistance and complexity of movement as indicated.      Date  2021 Date  2021 Date  2021 Date  2021   Activity/Exercise        Education        Thoracic extension  15xs over foam roll  X 5 min total over foam roller  3 min   SNAGS        rows 27lbs 3x15 33 3x10 37 lbs 3x10 37# 2 x 10   40# x 10  37lbs 3x10      Lat Pulldowns 33 lbs 3x10 37lbs 3x10 40lbs 3x10 40# 3 x 10  37lbs 3x10   Pec stretch    Doorway     No money Red band 20xs Red band Red band 20xs  Green band, 3x10   Cervical motion        L-stretch  15 seconds 2xs 15 seconds 3xs 15 10 seconds   Snow angels on the wall On foam roll 6 min 6 min   X 3 min  Snow angels on foam roll     Lat squeeze 30xs red band       DIagonals  grn band 30xs grn band 30xs  Blue band,3x10   [employer]     10 lbs, 3x5   \"Flexible Cross\"     3 min   Bent over row     10 lbs, 3x5   Farmer's Carry     4 laps, 15 lbs each hand                         THERAPEUTIC ACTIVITY: ( 0 minutes): Activities per gid below to improve functional movement related mobility, strength and balance to improve neuro-muscular carryover to daily functional activities for improving patient's quality of life. Required visual, verbal and manual cues to promote proper body alignment and promote proper body posture/mechanics. Progressed resistance and complexity of movement as indicated. Date:  7/19/2021 Date:   Date:     Activity/Exercise Parameters Parameters Parameters                                                   MANUAL THERAPY: (0 minutes): Joint mobilization, Soft tissue mobilization was utilized and necessary because of the patient's restricted joint motion and restricted motion of soft tissue mobility. Date  7/19/2021    Technique Used Grade Level # Time(s) Effect while being performed                                                                 HEP Log Date    7/19/2021   2.  7/19/2021   3. 7/19/2021   4.    5.           [employer] Portal  Treatment/Session Summary:    Response to Treatment: Pt continued with UE loading to improve tolerance to activity. No pain with exercises and had imprivement in back pain post treatment. Communication/Consultation:  POC, HEP, PT goals, Faxed initial evaluation to MD.   Equipment provided today: HEP Handout   Recommendations/Intent for next treatment session:   Next visit will focus on Flexion-based Exercises Extension-Based Exercises (EOTA) Manual Therapy Core Stability Dry Needling Hip strengthening RTC strengthening. Treatment Plan of Care Effective Dates: 6/2/2021 TO 8/2/2021 (60 days).   Frequency/Duration: 2 times a week for 60 Days     Total Treatment Billable Duration:    43 min  Rx   PT Patient Time In/Time Out  Time In: 1039  Time Out: Darian Sharp PT    Future Appointments   Date Time Provider Edy Marie   7/22/2021 11:15 AM Edwin Burroughs, PT [facility] AND [facility]   7/26/2021 10:15 AM [facility]s, [facility]

## 2021-07-22 ENCOUNTER — HOSPITAL ENCOUNTER (OUTPATIENT)
Dept: PHYSICAL THERAPY | Age: 61
Discharge: HOME | End: 2021-07-22
Payer: COMMERCIAL

## 2021-07-22 PROCEDURE — 97110 THERAPEUTIC EXERCISES: CPT

## 2021-07-23 NOTE — PROGRESS NOTES
Veronica Cunningham  : 1960  Payor: Phan Frames / Plan: [address] HMO / Product Type: HMO /  [address] [place] [address] at [address] [place] [address]. [address] [place] [address], [address] [place] [address]  [phone]   [phone]                                                          Vijaya Zazueta, [place]      OUTPATIENT PHYSICAL THERAPY: Daily Treatment Note 2021   Visit Count:  10    Tx Diagnosis:  Cervicalgia (M54.2)  Radiculopathy, Cervical Region (M54.12)  Headache (R51)  Low back pain (M54.5)  Radiculopathy, Lumbar Region (M54.16)  Muscle Weakness, Generalized (M62.81)      Pre-treatment Symptoms/Complaints: Please See Re-Certification note dated 21 for more details. Pain: Initial:0/10  Medications Last Reviewed:  2021     Post Session: 0/10   Updated Objective Findings: See Initial Eval for more details. TREATMENT:   THERAPEUTIC EXERCISE: (43 minutes):  Exercises per grid below to improve mobility, strength and balance. Required minimal visual, verbal and manual cues to promote proper body alignment and promote proper body posture. Progressed resistance and complexity of movement as indicated.      Date  2021 Date  2021 Date  2021 Date  2021 Date  2021   Activity/Exercise         Education         Thoracic extension  15xs over foam roll  X 5 min total over foam roller  3 min    SNAGS         rows 27lbs 3x15 33 3x10 37 lbs 3x10 37# 2 x 10   40# x 10  37lbs 3x10    37lbs 3x10   Lat Pulldowns 33 lbs 3x10 37lbs 3x10 40lbs 3x10 40# 3 x 10  37lbs 3x10    Pec stretch    Doorway      No money Red band 20xs Red band Red band 20xs  Green band, 3x10    Cervical motion         L-stretch  15 seconds 2xs 15 seconds 3xs 15 10 seconds    Snow angels on the wall On foam roll 6 min 6 min   X 3 min  Snow angels on foam roll   Snow angels on foam roll   Lat squeeze 30xs red band        DIagonals  grn band 30xs grn band 30xs  Blue band,3x10 [facility] band,3x10   Overhead Press     10 lbs, 3x5 10 lbs, 3x5   \"Flexible Cross\"     3 min    Bent over row     10 lbs, 3x5 10 lbs, 3x5   Farmer's Carry     4 laps, 15 lbs each hand 4 laps, 15 lbs each hand   Rolling patterns      10 min                  THERAPEUTIC ACTIVITY: ( 0 minutes): Activities per gid below to improve functional movement related mobility, strength and balance to improve neuro-muscular carryover to daily functional activities for improving patient's quality of life. Required visual, verbal and manual cues to promote proper body alignment and promote proper body posture/mechanics. Progressed resistance and complexity of movement as indicated. Date:  7/22/2021 Date:   Date:     Activity/Exercise Parameters Parameters Parameters                                                   MANUAL THERAPY: (0 minutes): Joint mobilization, Soft tissue mobilization was utilized and necessary because of the patient's restricted joint motion and restricted motion of soft tissue mobility. Date  7/22/2021    Technique Used Grade Level # Time(s) Effect while being performed                                                                 HEP Log Date    7/22/2021   2.  7/22/2021   3. 7/22/2021   4.    5.           [employer] Portal  Treatment/Session Summary:    Response to Treatment: Pt continued with UE loading to improve tolerance to activity. No pain with exercises and had imprivement in back pain post treatment. Communication/Consultation:  POC, HEP, PT goals, Faxed initial evaluation to MD.   Equipment provided today: HEP Handout   Recommendations/Intent for next treatment session:   Next visit will focus on Flexion-based Exercises Extension-Based Exercises (EOTA) Manual Therapy Core Stability Dry Needling Hip strengthening RTC strengthening. Treatment Plan of Care Effective Dates: 6/2/2021 TO 8/2/2021 (60 days).   Frequency/Duration: 2 times a week for 60 Days     Total Treatment Billable Duration:    43 min  Rx    Time in/out    Time in: 1115     Time out 10 Adonis Chapman PT    Future Appointments   Date Time Provider Edy Marie   7/26/2021 10:15 AM Ester Velásquez PT [facility] AND [facility]

## 2021-07-26 ENCOUNTER — HOSPITAL ENCOUNTER (OUTPATIENT)
Dept: PHYSICAL THERAPY | Age: 61
Discharge: HOME | End: 2021-07-26
Payer: COMMERCIAL

## 2021-07-26 PROCEDURE — 97110 THERAPEUTIC EXERCISES: CPT

## 2021-07-26 NOTE — PROGRESS NOTES
Kaylee Arenas  : 1960  Payor: Trice Landerosallum / Plan: Carry [address] HMO / Product Type: HMO /  [address] at [address] [facility]. [facility], [address] [place] [address], [facility], [address] [place] [address]  [phone]   [phone]                                                          Vijaya Zazueta PA      OUTPATIENT PHYSICAL THERAPY: Daily Treatment Note 2021   Visit Count:  11    Tx Diagnosis:  Cervicalgia (M54.2)  Radiculopathy, Cervical Region (M54.12)  Headache (R51)  Low back pain (M54.5)  Radiculopathy, Lumbar Region (M54.16)  Muscle Weakness, Generalized (M62.81)      Pre-treatment Symptoms/Complaints: Pt responded better from last treatment and had complete respolution of neck pain. Pain: Initial:0/10  Medications Last Reviewed:  2021     Post Session: 0/10   Updated Objective Findings: See Initial Eval for more details. TREATMENT:   THERAPEUTIC EXERCISE: (43 minutes):  Exercises per grid below to improve mobility, strength and balance. Required minimal visual, verbal and manual cues to promote proper body alignment and promote proper body posture. Progressed resistance and complexity of movement as indicated.      Date  2021 Date  2021 Date  2021 Date  2021 Date  2021   Activity/Exercise         Education         Thoracic extension 15xs over foam roll  X 5 min total over foam roller  3 min     SNAGS         rows 33 3x10 37 lbs 3x10 37# 2 x 10   40# x 10  37lbs 3x10    37lbs 3x10 40lbs 3x10   Lat Pulldowns 37lbs 3x10 40lbs 3x10 40# 3 x 10  37lbs 3x10  43lbs 3x10   Pec stretch   Doorway       No money Red band Red band 20xs  Green band, 3x10     Cervical motion         L-stretch 15 seconds 2xs 15 seconds 3xs 15 10 seconds     Snow angels on the wall 6 min   X 3 min  Snow angels on foam roll   Snow angels on foam roll    Lat squeeze         DIagonals grn band 30xs grn band 30xs  Blue band,3x10 Blue band,3x10 Blue band,3x10 Overhead Press    10 lbs, 3x5 10 lbs, 3x5 20lbs 3x10   \"Flexible Cross\"    3 min     Bent over row    10 lbs, 3x5 10 lbs, 3x5 45 3x5   Farmer's Carry    4 laps, 15 lbs each hand 4 laps, 15 lbs each hand 1 lap 15lbs  3 laps 20lbs   Rolling patterns     10 min 10 min UE and LE                  THERAPEUTIC ACTIVITY: ( 0 minutes): Activities per gid below to improve functional movement related mobility, strength and balance to improve neuro-muscular carryover to daily functional activities for improving patient's quality of life. Required visual, verbal and manual cues to promote proper body alignment and promote proper body posture/mechanics. Progressed resistance and complexity of movement as indicated. Date:  7/26/2021 Date:   Date:     Activity/Exercise Parameters Parameters Parameters                                                   MANUAL THERAPY: (0 minutes): Joint mobilization, Soft tissue mobilization was utilized and necessary because of the patient's restricted joint motion and restricted motion of soft tissue mobility. Date  7/26/2021    Technique Used Grade Level # Time(s) Effect while being performed                                                                 HEP Log Date    7/26/2021   2.  7/26/2021   3. 7/26/2021   4.    5.           [employer] Portal  Treatment/Session Summary:    Response to Treatment: Pt continued with weight lifting to improve strengthneing. NO pain with exercises and had complete relief of tightness. Communication/Consultation:  POC, HEP, PT goals, Faxed initial evaluation to MD.   Equipment provided today: HEP Handout   Recommendations/Intent for next treatment session:   Next visit will focus on Flexion-based Exercises Extension-Based Exercises (EOTA) Manual Therapy Core Stability Dry Needling Hip strengthening RTC strengthening. Treatment Plan of Care Effective Dates: 6/2/2021 TO 8/2/2021 (60 days).   Frequency/Duration: 2 times a week for 60 Days Total Treatment Billable Duration:    43 min  Rx   PT Patient Time In/Time Out  Time In: 1017  Time Out: 101 [facility] Carolynn Navas PT    No future appointments.

## 2021-08-05 ENCOUNTER — HOSPITAL ENCOUNTER (OUTPATIENT)
Dept: PHYSICAL THERAPY | Age: 61
Discharge: HOME | End: 2021-08-05
Payer: COMMERCIAL

## 2021-08-05 PROCEDURE — 97110 THERAPEUTIC EXERCISES: CPT

## 2021-08-05 NOTE — PROGRESS NOTES
Edward Guajardo  : 1960  Payor: Penny Duncan / Plan: Carry [address] HMO / Product Type: HMO /  Bruna Mcnulty at [address] [place] [address]. [facility], [address] [place] [address], [facility], [address] [place] [address]  [phone]   [phone]                                                          Vijaya Zazueta, [place]      OUTPATIENT PHYSICAL THERAPY: Daily Treatment Note 2021   Visit Count:  12    Tx Diagnosis:  Cervicalgia (M54.2)  Radiculopathy, Cervical Region (M54.12)  Headache (R51)  Low back pain (M54.5)  Radiculopathy, Lumbar Region (M54.16)  Muscle Weakness, Generalized (M62.81)      Pre-treatment Symptoms/Complaints: Please See Re-Certification note dated 21 for more details. Pain: Initial:0/10  Medications Last Reviewed:  2021     Post Session: 0/10   Updated Objective Findings: Please see Re-Certification  dated 21 for more details. TREATMENT:   THERAPEUTIC EXERCISE: (35 minutes):  Exercises per grid below to improve mobility, strength and balance. Required minimal visual, verbal and manual cues to promote proper body alignment and promote proper body posture. Progressed resistance and complexity of movement as indicated.      21 Date  2021 Date  2021 Date  2021 Date  2021   Activity/Exercise        Education        Thoracic extension X 5 min total over foam roller  3 min      SNAGS        rows 37# 2 x 10   40# x 10  37lbs 3x10    37lbs 3x10 40lbs 3x10 40lbs 3x10   Lat Pulldowns 40# 3 x 10  37lbs 3x10  43lbs 3x10 43lbs 3x10   Pec stretch Doorway        No money  Green band, 3x10      Cervical motion        L-stretch 15 10 seconds      Snow angels on the wall X 3 min  Snow angels on foam roll   [employer] on foam roll     Lat squeeze        DIagonals  Blue band,3x10 Blue band,3x10 Blue band,3x10 Blue band,3x10   [employer]  10 lbs, 3x5 10 lbs, 3x5 20lbs 3x10    \"Flexible Cross\"  3 min   3 min   Bent over row  10 lbs, 3x5 10 lbs, 3x5 45 3x5 Farmer's Carry  4 laps, 15 lbs each hand 4 laps, 15 lbs each hand 1 lap 15lbs  3 laps 20lbs 15 lbs SL 2 laps each   20 lbs 4 laps   Rolling patterns   10 min 10 min UE and LE                  THERAPEUTIC ACTIVITY: ( 0 minutes): Activities per gid below to improve functional movement related mobility, strength and balance to improve neuro-muscular carryover to daily functional activities for improving patient's quality of life. Required visual, verbal and manual cues to promote proper body alignment and promote proper body posture/mechanics. Progressed resistance and complexity of movement as indicated. Date:  8/5/2021 Date:   Date:     Activity/Exercise Parameters Parameters Parameters                                                   MANUAL THERAPY: (0 minutes): Joint mobilization, Soft tissue mobilization was utilized and necessary because of the patient's restricted joint motion and restricted motion of soft tissue mobility. Date  8/5/2021    Technique Used Grade Level # Time(s) Effect while being performed                                                                 HEP Log Date    8/5/2021   2.  8/5/2021   3. 8/5/2021   4.    5.           [employer] Portal  Treatment/Session Summary:    Response to Treatment: Please See Certification Note dated 8.5.21 for more details. Communication/Consultation:  Faxed Re-certification Note to MD.   Equipment provided today: HEP Handout   Recommendations/Intent for next treatment session:   Next visit will focus on Flexion-based Exercises Extension-Based Exercises (EOTA) Manual Therapy Core Stability Dry Needling Hip strengthening RTC strengthening. Treatment Plan of Care Effective Dates: 6/2/2021 TO 8/2/2021 (60 days). Frequency/Duration: 2 times a week for 60 Days     Total Treatment Billable Duration:    35 min  Rx   PT Patient Time In/Time Out  Time In: 1040  Time Out: Marty Pham PT    No future appointments.

## 2022-03-18 PROBLEM — I10 ESSENTIAL HYPERTENSION: Status: ACTIVE | Noted: 2019-06-07

## 2022-03-19 PROBLEM — M17.11 PRIMARY OSTEOARTHRITIS OF RIGHT KNEE: Status: ACTIVE | Noted: 2019-01-03

## 2022-03-19 PROBLEM — Z98.84 H/O GASTRIC BYPASS: Status: ACTIVE | Noted: 2019-06-07

## 2022-03-19 PROBLEM — F32.5 MAJOR DEPRESSIVE DISORDER IN FULL REMISSION (HCC): Status: ACTIVE | Noted: 2019-06-07

## 2022-03-19 PROBLEM — Z12.11 SCREEN FOR COLON CANCER: Status: ACTIVE | Noted: 2019-08-29

## 2022-06-30 ENCOUNTER — OFFICE VISIT (OUTPATIENT)
Dept: ORTHOPEDIC SURGERY | Age: 62
End: 2022-06-30
Payer: COMMERCIAL

## 2022-06-30 DIAGNOSIS — M17.12 PRIMARY OSTEOARTHRITIS OF LEFT KNEE: ICD-10-CM

## 2022-06-30 DIAGNOSIS — M17.11 PRIMARY OSTEOARTHRITIS OF RIGHT KNEE: Primary | ICD-10-CM

## 2022-06-30 PROCEDURE — 99213 OFFICE O/P EST LOW 20 MIN: CPT | Performed by: ORTHOPAEDIC SURGERY

## 2022-06-30 NOTE — PROGRESS NOTES
Name: Alfie Curiel  YOB: 1960  Gender: female  MRN: [id number]      CC: Knee Pain (bilateral knee recheck)       HPI: Alfie Curiel is a 62 y.o. female who returns for follow up on both of her knees. She completed her Euflexxa injection series on 5/10/22 and reports only minimal relief. She reports continued soreness in her knees and has a new complaint of right posterior knee pain. She says this has been ongoing for awhile now but this is her first time having me evaluate it. She describes a constant aching sensation that feels better with knee extension. She also says her knees feel tight when she flexes them due to increased swelling. Physical Examination:  General: no acute distress  Lungs: breathing easily  CV: regular rhythm by pulse  Right Knee and Left Knee: Bilateral knees diffuse tenderness medial lateral joint line. The right knee has pain posteriorly around the hamstring and popliteal fossa no evidence of thrombosis however. Pain with extremes of motion        Assessment:     ICD-10-CM    1. Primary osteoarthritis of right knee  M17.11 Ambulatory referral to Physical Therapy   2. Primary osteoarthritis of left knee  M17.12 Ambulatory referral to Physical Therapy       Plan:   I do think she would benefit from some physical therapy for treatment of her muscular symptoms on the right side. She has had extensive conservative management with viscosupplementation and multiple cortisone injections. She has lost over 20 pounds recently which we congratulated her on. I think it is reasonable for her to meet with one of our arthroplasty surgeons to begin the discussion of potential total knee arthroplasty. I am happy to see her back in the future if necessary. Candi Arriaga MD, [address] [place] [address] and Sports Medicine

## 2024-01-01 NOTE — PROCEDURE: OTHER
Detail Level: Zone
Other (Free Text): Right cheek lesion is resolving. Suspect all 3 lesions are same diagnosis. Pt wanted to only biopsy one lesion due to concerns about scarring on face. Pending results, will determine if further biopsies are needed. Newest, enlarging lesion biopsied today.
Note Text (......Xxx Chief Complaint.): This diagnosis correlates with the
N/A

## 2024-07-30 ENCOUNTER — APPOINTMENT (RX ONLY)
Dept: URBAN - METROPOLITAN AREA CLINIC 330 | Facility: CLINIC | Age: 64
Setting detail: DERMATOLOGY
End: 2024-07-30

## 2024-07-30 DIAGNOSIS — L81.4 OTHER MELANIN HYPERPIGMENTATION: ICD-10-CM | Status: STABLE

## 2024-07-30 DIAGNOSIS — L44.8 OTHER SPECIFIED PAPULOSQUAMOUS DISORDERS: ICD-10-CM

## 2024-07-30 DIAGNOSIS — D22 MELANOCYTIC NEVI: ICD-10-CM | Status: STABLE

## 2024-07-30 DIAGNOSIS — L57.8 OTHER SKIN CHANGES DUE TO CHRONIC EXPOSURE TO NONIONIZING RADIATION: ICD-10-CM

## 2024-07-30 DIAGNOSIS — L82.1 OTHER SEBORRHEIC KERATOSIS: ICD-10-CM | Status: STABLE

## 2024-07-30 DIAGNOSIS — D18.0 HEMANGIOMA: ICD-10-CM | Status: STABLE

## 2024-07-30 PROBLEM — D22.5 MELANOCYTIC NEVI OF TRUNK: Status: ACTIVE | Noted: 2024-07-30

## 2024-07-30 PROBLEM — D18.01 HEMANGIOMA OF SKIN AND SUBCUTANEOUS TISSUE: Status: ACTIVE | Noted: 2024-07-30

## 2024-07-30 PROBLEM — D23.72 OTHER BENIGN NEOPLASM OF SKIN OF LEFT LOWER LIMB, INCLUDING HIP: Status: ACTIVE | Noted: 2024-07-30

## 2024-07-30 PROCEDURE — 99203 OFFICE O/P NEW LOW 30 MIN: CPT

## 2024-07-30 PROCEDURE — ? TREATMENT REGIMEN

## 2024-07-30 PROCEDURE — ? BODY PHOTOGRAPHY

## 2024-07-30 PROCEDURE — ? COUNSELING

## 2024-07-30 ASSESSMENT — LOCATION DETAILED DESCRIPTION DERM
LOCATION DETAILED: LEFT PROXIMAL PRETIBIAL REGION
LOCATION DETAILED: RIGHT SUPERIOR MEDIAL UPPER BACK
LOCATION DETAILED: SUPERIOR THORACIC SPINE
LOCATION DETAILED: INFERIOR THORACIC SPINE
LOCATION DETAILED: RIGHT DISTAL DORSAL FOREARM
LOCATION DETAILED: RIGHT MEDIAL MALAR CHEEK
LOCATION DETAILED: LEFT DISTAL DORSAL FOREARM
LOCATION DETAILED: LEFT MEDIAL SUPERIOR CHEST
LOCATION DETAILED: RIGHT MEDIAL UPPER BACK
LOCATION DETAILED: LEFT PROXIMAL DORSAL FOREARM
LOCATION DETAILED: RIGHT MEDIAL SUPERIOR CHEST
LOCATION DETAILED: STERNAL NOTCH
LOCATION DETAILED: LEFT INFERIOR CENTRAL MALAR CHEEK
LOCATION DETAILED: RIGHT PROXIMAL DORSAL FOREARM

## 2024-07-30 ASSESSMENT — LOCATION SIMPLE DESCRIPTION DERM
LOCATION SIMPLE: LEFT CHEEK
LOCATION SIMPLE: RIGHT CHEEK
LOCATION SIMPLE: LEFT PRETIBIAL REGION
LOCATION SIMPLE: RIGHT UPPER BACK
LOCATION SIMPLE: CHEST
LOCATION SIMPLE: UPPER BACK
LOCATION SIMPLE: RIGHT FOREARM
LOCATION SIMPLE: LEFT FOREARM

## 2024-07-30 ASSESSMENT — LOCATION ZONE DERM
LOCATION ZONE: LEG
LOCATION ZONE: TRUNK
LOCATION ZONE: ARM
LOCATION ZONE: FACE

## 2024-07-30 NOTE — PROCEDURE: BODY PHOTOGRAPHY
Reason For Photography: The patient is obtaining whole body photography to observe existing suspicious moles and or monitor for the appearance of any new lesions.
Detail Level: Detailed
Consent was obtained for whole body photography. Patient understands that photograph costs may not be covered by insurance.
Was The Entire Body Photographed (Cannot Bill Unless Entire Body Photographed)?: Please Select Yes or No - If you select Yes you are confirming that you took full body photographs
Number Of Photographs (Optional- Will Not Render If 0): 1
Whole Body Statement: The whole body was photographed today.

## 2024-07-30 NOTE — HPI: EVALUATION OF SKIN LESION(S)
What Type Of Note Output Would You Prefer (Optional)?: Bullet Format
Hpi Title: Evaluation of Skin Lesions
How Severe Are Your Spot(S)?: mild
Have Your Spot(S) Been Treated In The Past?: has not been treated
Additional History: Spot on forehead, chest and right upper arm\\n\\nFirst ever skin check

## 2024-07-30 NOTE — PROCEDURE: COUNSELING
Detail Level: Generalized
Sunscreen Recommendations: Patient was counseled about using sunblock daily to protect skin from UV radiation damage. Physical sunblock filters, such as zinc oxide and titanium dioxide as well as chemical UV filters discussed today.
Detail Level: Zone
Detail Level: Simple
Detail Level: Detailed
Dilated cbd, acquired

## 2025-07-31 ENCOUNTER — APPOINTMENT (OUTPATIENT)
Dept: URBAN - METROPOLITAN AREA CLINIC 330 | Facility: CLINIC | Age: 65
Setting detail: DERMATOLOGY
End: 2025-07-31

## 2025-07-31 DIAGNOSIS — D18.0 HEMANGIOMA: ICD-10-CM

## 2025-07-31 DIAGNOSIS — L57.8 OTHER SKIN CHANGES DUE TO CHRONIC EXPOSURE TO NONIONIZING RADIATION: ICD-10-CM

## 2025-07-31 DIAGNOSIS — L81.4 OTHER MELANIN HYPERPIGMENTATION: ICD-10-CM

## 2025-07-31 DIAGNOSIS — L82.1 OTHER SEBORRHEIC KERATOSIS: ICD-10-CM

## 2025-07-31 DIAGNOSIS — L57.0 ACTINIC KERATOSIS: ICD-10-CM

## 2025-07-31 DIAGNOSIS — D22 MELANOCYTIC NEVI: ICD-10-CM

## 2025-07-31 PROBLEM — D23.72 OTHER BENIGN NEOPLASM OF SKIN OF LEFT LOWER LIMB, INCLUDING HIP: Status: ACTIVE | Noted: 2025-07-31

## 2025-07-31 PROBLEM — D18.01 HEMANGIOMA OF SKIN AND SUBCUTANEOUS TISSUE: Status: ACTIVE | Noted: 2025-07-31

## 2025-07-31 PROBLEM — D22.5 MELANOCYTIC NEVI OF TRUNK: Status: ACTIVE | Noted: 2025-07-31

## 2025-07-31 PROCEDURE — ? LIQUID NITROGEN

## 2025-07-31 PROCEDURE — ? FULL BODY SKIN EXAM

## 2025-07-31 PROCEDURE — ? PHOTO-DOCUMENTATION

## 2025-07-31 PROCEDURE — ? TREATMENT REGIMEN

## 2025-07-31 PROCEDURE — ? COUNSELING

## 2025-07-31 ASSESSMENT — LOCATION ZONE DERM
LOCATION ZONE: FACE
LOCATION ZONE: LEG
LOCATION ZONE: ARM
LOCATION ZONE: TRUNK

## 2025-07-31 ASSESSMENT — LOCATION SIMPLE DESCRIPTION DERM
LOCATION SIMPLE: RIGHT FOREARM
LOCATION SIMPLE: RIGHT UPPER BACK
LOCATION SIMPLE: UPPER BACK
LOCATION SIMPLE: LEFT FOREARM
LOCATION SIMPLE: LEFT FOREHEAD
LOCATION SIMPLE: LEFT PRETIBIAL REGION
LOCATION SIMPLE: LEFT CHEEK
LOCATION SIMPLE: RIGHT CHEEK
LOCATION SIMPLE: CHEST

## 2025-07-31 ASSESSMENT — LOCATION DETAILED DESCRIPTION DERM
LOCATION DETAILED: RIGHT MEDIAL SUPERIOR CHEST
LOCATION DETAILED: LEFT DISTAL DORSAL FOREARM
LOCATION DETAILED: LEFT INFERIOR CENTRAL MALAR CHEEK
LOCATION DETAILED: RIGHT PROXIMAL DORSAL FOREARM
LOCATION DETAILED: RIGHT MEDIAL MALAR CHEEK
LOCATION DETAILED: LEFT PROXIMAL PRETIBIAL REGION
LOCATION DETAILED: LEFT MEDIAL SUPERIOR CHEST
LOCATION DETAILED: SUPERIOR THORACIC SPINE
LOCATION DETAILED: RIGHT DISTAL DORSAL FOREARM
LOCATION DETAILED: LEFT FOREHEAD
LOCATION DETAILED: LEFT PROXIMAL DORSAL FOREARM
LOCATION DETAILED: STERNAL NOTCH
LOCATION DETAILED: INFERIOR THORACIC SPINE
LOCATION DETAILED: RIGHT SUPERIOR MEDIAL UPPER BACK

## 2025-07-31 NOTE — PROCEDURE: COUNSELING
Detail Level: Generalized
Sunscreen Recommendations: Patient was counseled about using sunblock daily to protect skin from UV radiation damage. Physical sunblock filters, such as zinc oxide and titanium dioxide as well as chemical UV filters discussed today.
Detail Level: Simple
Detail Level: Zone
Detail Level: Detailed
